# Patient Record
Sex: FEMALE | Race: WHITE | NOT HISPANIC OR LATINO | Employment: PART TIME | URBAN - METROPOLITAN AREA
[De-identification: names, ages, dates, MRNs, and addresses within clinical notes are randomized per-mention and may not be internally consistent; named-entity substitution may affect disease eponyms.]

---

## 2018-03-26 ENCOUNTER — OFFICE VISIT - HEALTHEAST (OUTPATIENT)
Dept: FAMILY MEDICINE | Facility: CLINIC | Age: 23
End: 2018-03-26

## 2018-03-26 DIAGNOSIS — J32.9 SINUSITIS: ICD-10-CM

## 2018-03-26 ASSESSMENT — MIFFLIN-ST. JEOR: SCORE: 1495.67

## 2019-01-01 ENCOUNTER — TRANSFERRED RECORDS (OUTPATIENT)
Dept: MULTI SPECIALTY CLINIC | Facility: CLINIC | Age: 24
End: 2019-01-01

## 2019-01-01 LAB — PAP SMEAR - HIM PATIENT REPORTED: NORMAL

## 2019-04-02 ENCOUNTER — OFFICE VISIT - HEALTHEAST (OUTPATIENT)
Dept: FAMILY MEDICINE | Facility: CLINIC | Age: 24
End: 2019-04-02

## 2019-04-02 DIAGNOSIS — L72.3 SEBACEOUS CYST: ICD-10-CM

## 2019-07-21 ENCOUNTER — COMMUNICATION - HEALTHEAST (OUTPATIENT)
Dept: FAMILY MEDICINE | Facility: CLINIC | Age: 24
End: 2019-07-21

## 2019-07-21 DIAGNOSIS — L98.9 SKIN LESION: ICD-10-CM

## 2019-07-21 DIAGNOSIS — E84.9 CF (CYSTIC FIBROSIS) (H): ICD-10-CM

## 2019-07-22 ENCOUNTER — COMMUNICATION - HEALTHEAST (OUTPATIENT)
Dept: ADMINISTRATIVE | Facility: CLINIC | Age: 24
End: 2019-07-22

## 2019-07-22 DIAGNOSIS — J38.3 OTHER DISEASES OF VOCAL CORDS: ICD-10-CM

## 2019-07-22 DIAGNOSIS — N89.8 CYST, VAGINA: ICD-10-CM

## 2019-07-22 DIAGNOSIS — E84.9 CF (CYSTIC FIBROSIS) (H): ICD-10-CM

## 2019-07-26 ENCOUNTER — RECORDS - HEALTHEAST (OUTPATIENT)
Dept: ADMINISTRATIVE | Facility: OTHER | Age: 24
End: 2019-07-26

## 2019-08-15 ENCOUNTER — RECORDS - HEALTHEAST (OUTPATIENT)
Dept: ADMINISTRATIVE | Facility: OTHER | Age: 24
End: 2019-08-15

## 2019-09-12 ENCOUNTER — COMMUNICATION - HEALTHEAST (OUTPATIENT)
Dept: FAMILY MEDICINE | Facility: CLINIC | Age: 24
End: 2019-09-12

## 2019-09-12 DIAGNOSIS — L72.3 SEBACEOUS CYST: ICD-10-CM

## 2019-10-22 ENCOUNTER — RECORDS - HEALTHEAST (OUTPATIENT)
Dept: ADMINISTRATIVE | Facility: OTHER | Age: 24
End: 2019-10-22

## 2020-01-20 ENCOUNTER — COMMUNICATION - HEALTHEAST (OUTPATIENT)
Dept: FAMILY MEDICINE | Facility: CLINIC | Age: 25
End: 2020-01-20

## 2020-02-05 ENCOUNTER — RECORDS - HEALTHEAST (OUTPATIENT)
Dept: ADMINISTRATIVE | Facility: OTHER | Age: 25
End: 2020-02-05

## 2020-03-18 ENCOUNTER — RECORDS - HEALTHEAST (OUTPATIENT)
Dept: ADMINISTRATIVE | Facility: OTHER | Age: 25
End: 2020-03-18

## 2020-04-02 ENCOUNTER — RECORDS - HEALTHEAST (OUTPATIENT)
Dept: ADMINISTRATIVE | Facility: OTHER | Age: 25
End: 2020-04-02

## 2021-02-18 ENCOUNTER — COMMUNICATION - HEALTHEAST (OUTPATIENT)
Dept: FAMILY MEDICINE | Facility: CLINIC | Age: 26
End: 2021-02-18

## 2021-05-27 NOTE — PROGRESS NOTES
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  It slightly uncomfortable but the thing it is annoying as it drains of serosanguineous fluid.  She is getting ready to go on a cruise, will be wearing bathing attire, and would like to avoid obvious drainage.    She has no vaginal drainage, she is never had a Pap smear and is not sexually active.    ROS: No fever.  No erythema or warmth in the skin around the area.    SH:    reports that  has never smoked. she has never used smokeless tobacco. She reports that she does not drink alcohol or use drugs.      FH: The Patient's family history is not on file.     Meds:  Anu has a current medication list which includes the following prescription(s): albuterol and cephalexin.    O:  BP 98/64   Pulse 100   Resp 16   Wt 151 lb (68.5 kg)   SpO2 99%   BMI 22.30 kg/m    With my nurse Ivette in the room the patient's groin and vaginal area were explored.  There is a 1 x 1-1/2 cm firm area of tenderness in the left inguinal region with a 6 mm opening consistent with a draining cyst.    A/P:   1. Sebaceous cyst  Most likely represents sebaceous cyst.  We discussed options and patient wished to have it explored and possibly drain.  After verbal consent obtained 1 cc of lidocaine with epinephrine was injected in the area after it was prepped with Betadine.  A 22-gauge needle was then used to explore the area and attempt to aspirate fluid when none was obtained.  Will treat with antibiotics and refer to GYN or Derm for removal if needed.  - cephalexin (KEFLEX) 500 MG capsule; Take 1 capsule (500 mg total) by mouth 4 (four) times a day for 10 days.  Dispense: 40 capsule; Refill: 0

## 2021-05-30 NOTE — TELEPHONE ENCOUNTER
Dr. Robles reviewed your notes from patients last visit and wants her to be referred to an ob-gyn instead. She was scheduled with him, but he wants to send her, so please enter a new order. The surgeons office will call and cancel and tell her to expect a vall from an ob gyn. Thank you.

## 2021-05-30 NOTE — TELEPHONE ENCOUNTER
Spoke with the patient and per Dr. Robles this is an OBGYN issue not Gen Surg. Notified Dr. Oliveira's office and they will be submitting an order to OB/GYN

## 2021-06-01 VITALS — WEIGHT: 151.3 LBS | HEIGHT: 69 IN | BODY MASS INDEX: 22.41 KG/M2

## 2021-06-01 NOTE — TELEPHONE ENCOUNTER
Tell pt that I think derm would be fine, and I have placed a referral.  Tell her to keep me posted of the progress.

## 2021-06-02 VITALS — BODY MASS INDEX: 22.3 KG/M2 | WEIGHT: 151 LBS

## 2021-06-05 NOTE — TELEPHONE ENCOUNTER
If pt still has the [facility] and is assigned to our cliic, we cannt give a referral to [facility] clinic to see Dr. Shell, needs to stay within [facility]

## 2021-06-05 NOTE — TELEPHONE ENCOUNTER
Referral Request  Type of referral: ENT  Who s requesting: Mom  Why the request: Tonsillitis without strep   Have you been seen for this request: Yes in 2015  Does patient have a preference on a group/provider? Dr. Shell, [facility], phone number [phone]  Okay to leave a detailed message?  Yes  [phone]    FYI: Caller stated that Dr. Mcgovern had placed a referral to ENT in 2015. Caller stated that the patient went to see the ENT and was advised to have her tonsils removed. Caller stated that at that time the patient was back and forth with school and did not want to proceed with this procedure. Caller stated that the patient is back home from school and went to see another ENT, Dr. Shell and they are requesting a referral be sent as [facility] where Dr. Shell is doing surgery is not in network. Caller stated that she is aware that the referral placed was in 2015 and questioning if Dr. Mcgovern is ok with placing this referral? Caller stated that if the patient is needed to come back to be re-evaluated please call her at [phone], ok to leave a detailed message.

## 2021-06-05 NOTE — TELEPHONE ENCOUNTER
I have only seen this patient twice, she is not mine.  I saw her in 2015 and 2013.  Dr. Oliveira has seen her within the last year.  He will need to give the referral.

## 2021-06-16 NOTE — PROGRESS NOTES
PROGRESS NOTE   3/26/2018    SUBJECTIVE:  Anu Roberto is a 22 y.o. female  who presents for   Chief Complaint   Patient presents with     Sinusitis     Check sinus congestion, headache, decreased hearing in left ear, slight sore throat x 2 weeks     Patient comes in today because she has been feeling well for the last 2 weeks.  She is felt like she has had nasal and sinus congestion for the last 2 weeks.  It may be getting a tiny bit better but not significantly improved.  She is going on a cruise on Saturday and so is going be flying on a plane and so wanted this cleared up before then.  She notes that she is also had decreased hearing in her left ear.  She has previously had problems with earwax is wondering if perhaps it is earwax or if there is something going on that is related to the congestion.  She also has had a sore throat on and off as well as intermittent headaches.  She thinks that she had a fever when this first started but she has not had a fever recently.  She denies that she is having any kind of a cough or other symptoms.    Patient Active Problem List   Diagnosis     Vocal Cord Disorder       Current Outpatient Prescriptions   Medication Sig Dispense Refill     albuterol (VENTOLIN HFA) 90 mcg/actuation inhaler Inhale 2 puffs every 6 (six) hours as needed for wheezing. 1 Inhaler 2     azithromycin (ZITHROMAX Z-MARCIA) 250 MG tablet Take 2 tablets (500 mg) on  Day 1,  followed by 1 tablet (250 mg) once daily on Days 2 through 5. 6 tablet 0     No current facility-administered medications for this visit.        No Known Allergies    Past Medical History:   Diagnosis Date     Other diseases of vocal cords     uses ventolin for this       Past Surgical History:   Procedure Laterality Date     RHINOPLASTY      for broken nose     WISDOM TOOTH EXTRACTION         History   Smoking Status     Never Smoker   Smokeless Tobacco     Never Used       OBJECTIVE:     /64 (Patient Site: Right Arm, Patient  "Position: Sitting, Cuff Size: Adult Regular)  Pulse 68 Comment: regular  Temp 98.1  F (36.7  C) (Oral)   Resp 14 Comment: regular  Ht 5' 9\" (1.753 m)  Wt 151 lb 4.8 oz (68.6 kg)  BMI 22.34 kg/m2    Physical Exam:  GENERAL APPEARANCE: A&A, NAD, well hydrated, well nourished  SKIN:  Normal skin turgor, no lesions/rashes   HEENT: moist mucous membranes, no rhinorrhea, PERRLA, TM's with fluid present bilaterally but no evidence for acute infection, Throat without significant erythema or exudate.  Mild tenderness is noted over the maxillary as well as frontal sinuses bilaterally.  NECK: Supple, full ROM, no significant lymphadenopathy or thyromegaly  CV: RRR, no M/G/R   LUNGS: CTAB  ABDOMEN: S&NT, no masses or organomegaly, positive bowel sounds   EXTREMITY: no swelling noted.  Full range of motion of all 4 extremities.   NEURO: no gross deficits   PSYCHIATRIC;  Mood appropriate, memory intact      ASSESSMENT/PLAN:     Sinusitis [J32.9]      1. Sinusitis  - azithromycin (ZITHROMAX Z-MARCIA) 250 MG tablet; Take 2 tablets (500 mg) on  Day 1,  followed by 1 tablet (250 mg) once daily on Days 2 through 5.  Dispense: 6 tablet; Refill: 0    Patient overall seems to be doing okay.  I am going to go ahead and start her on some Zithromax as I think she probably does have sinus infection.  She has lots of nasal discharge draining down the back of her throat and with the congestion that she has had for over 2 weeks I think she does need an antibiotic.  Prescription for Zithromax was sent to the pharmacy today.  I also suggested that she use some Sudafed to see if that will clear up the fluid that is in her ears.  We talked about the pathophysiology of serous otitis media and the fact that we need to decrease the congestion in order to open the little tubes that then will drain the fluid out of her ears.  I would suspect that this will be a gradual process but should get better with time.  If she has increasing problems or " concerns or does not have gradual improvement in her symptoms she certainly should let us know.  We otherwise will see her on an as-needed basis  Marely Castillo MD

## 2021-08-01 ENCOUNTER — TRANSFERRED RECORDS (OUTPATIENT)
Dept: MULTI SPECIALTY CLINIC | Facility: CLINIC | Age: 26
End: 2021-08-01

## 2021-08-01 LAB — PAP SMEAR - HIM PATIENT REPORTED: NEGATIVE

## 2021-08-21 ENCOUNTER — HEALTH MAINTENANCE LETTER (OUTPATIENT)
Age: 26
End: 2021-08-21

## 2021-10-06 ENCOUNTER — TRANSFERRED RECORDS (OUTPATIENT)
Dept: HEALTH INFORMATION MANAGEMENT | Facility: CLINIC | Age: 26
End: 2021-10-06

## 2021-10-16 ENCOUNTER — HEALTH MAINTENANCE LETTER (OUTPATIENT)
Age: 26
End: 2021-10-16

## 2022-05-25 ASSESSMENT — ENCOUNTER SYMPTOMS
BREAST MASS: 0
DYSURIA: 0
NAUSEA: 0
COUGH: 0
ABDOMINAL PAIN: 0
CONSTIPATION: 0
FREQUENCY: 0
HEARTBURN: 0
DIZZINESS: 0
MYALGIAS: 0
HEMATURIA: 0
HEMATOCHEZIA: 0
DIARRHEA: 0
FEVER: 0
HEADACHES: 0
WEAKNESS: 0
ARTHRALGIAS: 0
PALPITATIONS: 0
SORE THROAT: 0
JOINT SWELLING: 0
PARESTHESIAS: 0
CHILLS: 0
EYE PAIN: 0
NERVOUS/ANXIOUS: 0
SHORTNESS OF BREATH: 0

## 2022-05-26 ENCOUNTER — OFFICE VISIT (OUTPATIENT)
Dept: FAMILY MEDICINE | Facility: CLINIC | Age: 27
End: 2022-05-26
Payer: COMMERCIAL

## 2022-05-26 VITALS
OXYGEN SATURATION: 96 % | HEIGHT: 70 IN | SYSTOLIC BLOOD PRESSURE: 120 MMHG | BODY MASS INDEX: 22.38 KG/M2 | HEART RATE: 81 BPM | DIASTOLIC BLOOD PRESSURE: 80 MMHG | WEIGHT: 156.3 LBS

## 2022-05-26 DIAGNOSIS — Z11.1 SCREENING EXAMINATION FOR PULMONARY TUBERCULOSIS: ICD-10-CM

## 2022-05-26 DIAGNOSIS — Z00.00 ANNUAL PHYSICAL EXAM: Primary | ICD-10-CM

## 2022-05-26 PROCEDURE — 86481 TB AG RESPONSE T-CELL SUSP: CPT | Performed by: NURSE PRACTITIONER

## 2022-05-26 PROCEDURE — 36415 COLL VENOUS BLD VENIPUNCTURE: CPT | Performed by: NURSE PRACTITIONER

## 2022-05-26 PROCEDURE — 99385 PREV VISIT NEW AGE 18-39: CPT | Performed by: NURSE PRACTITIONER

## 2022-05-26 ASSESSMENT — ENCOUNTER SYMPTOMS
CHILLS: 0
NERVOUS/ANXIOUS: 0
EYE PAIN: 0
COUGH: 0
BREAST MASS: 0
FEVER: 0
CONSTIPATION: 0
MYALGIAS: 0
ARTHRALGIAS: 0
PALPITATIONS: 0
PARESTHESIAS: 0
WEAKNESS: 0
HEADACHES: 0
FREQUENCY: 0
NAUSEA: 0
DIARRHEA: 0
DYSURIA: 0
HEMATURIA: 0
HEARTBURN: 0
SHORTNESS OF BREATH: 0
SORE THROAT: 0
JOINT SWELLING: 0
ABDOMINAL PAIN: 0
DIZZINESS: 0
HEMATOCHEZIA: 0

## 2022-05-26 ASSESSMENT — PAIN SCALES - GENERAL: PAINLEVEL: NO PAIN (0)

## 2022-05-26 NOTE — PROGRESS NOTES
SUBJECTIVE:   CC: Anu Roberto is an 26 year old woman who presents for preventive health visit.     Doing well.  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    Patient has been advised of split billing requirements and indicates understanding: Yes  Healthy Habits:     Getting at least 3 servings of Calcium per day:  Yes    Bi-annual eye exam:  Yes    Dental care twice a year:  Yes    Sleep apnea or symptoms of sleep apnea:  None    Diet:  Regular (no restrictions)    Frequency of exercise:  2-3 days/week    Duration of exercise:  30-45 minutes    Taking medications regularly:  Not Applicable    Medication side effects:  Not applicable    PHQ-2 Total Score: 0    Additional concerns today:  No      Today's PHQ-2 Score:   PHQ-2 ( 1999 [employer]) 5/25/2022   Q1: Little interest or pleasure in doing things 0   Q2: Feeling down, depressed or hopeless 0   PHQ-2 Score 0   Q1: Little interest or pleasure in doing things Not at all   Q2: Feeling down, depressed or hopeless Not at all   PHQ-2 Score 0       Abuse: Current or Past (Physical, Sexual or Emotional) - No  Do you feel safe in your environment? Yes    Have you ever done Advance Care Planning? (For example, a Health Directive, POLST, or a discussion with a medical provider or your loved ones about your wishes): No, advance care planning information given to patient to review.  Patient declined advance care planning discussion at this time.    Social History     Tobacco Use     Smoking status: Never Smoker     Smokeless tobacco: Never Used   Substance Use Topics     Alcohol use: No     If you drink alcohol do you typically have >3 drinks per day or >7 drinks per week? No    Alcohol Use 5/25/2022   Prescreen: >3 drinks/day or >7 drinks/week? No       Reviewed orders with patient.  Reviewed health maintenance and updated orders accordingly - Yes  Lab work is in process    Breast Cancer Screening:    Breast CA Risk Assessment  (FHS-7) 5/25/2022   Do you have a family history of breast, colon, or ovarian cancer? No / Unknown       Patient under 40 years of age: Routine Mammogram Screening not recommended.   Pertinent mammograms are reviewed under the imaging tab.     Reviewed and updated as needed this visit by clinical staff   Tobacco  Allergies  Meds                Reviewed and updated as needed this visit by Provider                   No past medical history on file.   Past Surgical History:   Procedure Laterality Date     RHINOPLASTY      for broken nose     WISDOM TOOTH EXTRACTION         Review of Systems   Constitutional: Negative for chills and fever.   HENT: Negative for congestion, ear pain, hearing loss and sore throat.    Eyes: Negative for pain and visual disturbance.   Respiratory: Negative for cough and shortness of breath.    Cardiovascular: Negative for chest pain, palpitations and peripheral edema.   Gastrointestinal: Negative for abdominal pain, constipation, diarrhea, heartburn, hematochezia and nausea.   Breasts:  Negative for tenderness, breast mass and discharge.   Genitourinary: Negative for dysuria, frequency, genital sores, hematuria, pelvic pain, urgency, vaginal bleeding and vaginal discharge.   Musculoskeletal: Negative for arthralgias, joint swelling and myalgias.   Skin: Negative for rash.   Neurological: Negative for dizziness, weakness, headaches and paresthesias.   Psychiatric/Behavioral: Negative for mood changes. The patient is not nervous/anxious.      CONSTITUTIONAL: NEGATIVE for fever, chills, change in weight  INTEGUMENTARU/SKIN: NEGATIVE for worrisome rashes, moles or lesions  EYES: NEGATIVE for vision changes or irritation  ENT: NEGATIVE for ear, mouth and throat problems  RESP: NEGATIVE for significant cough or SOB  BREAST: NEGATIVE for masses, tenderness or discharge  CV: NEGATIVE for chest pain, palpitations or peripheral edema  GI: NEGATIVE for nausea, abdominal pain, heartburn, or change in  "bowel habits  : NEGATIVE for unusual urinary or vaginal symptoms. Periods are regular.  MUSCULOSKELETAL: NEGATIVE for significant arthralgias or myalgia  NEURO: NEGATIVE for weakness, dizziness or paresthesias  PSYCHIATRIC: NEGATIVE for changes in mood or affect     OBJECTIVE:   /80   Pulse 81   Ht 1.778 m (5' 10\")   Wt 70.9 kg (156 lb 4.8 oz)   LMP 04/24/2022 (Exact Date)   SpO2 96%   Breastfeeding No   BMI 22.43 kg/m    Physical Exam  GENERAL: healthy, alert and no distress  EYES: Eyes grossly normal to inspection, PERRL and conjunctivae and sclerae normal  HENT: ear canals and TM's normal, nose and mouth without ulcers or lesions  NECK: no adenopathy, no asymmetry, masses, or scars and thyroid normal to palpation  RESP: lungs clear to auscultation - no rales, rhonchi or wheezes  CV: regular rate and rhythm, normal S1 S2, no S3 or S4, no murmur, click or rub, no peripheral edema and peripheral pulses strong  ABDOMEN: soft, nontender, no hepatosplenomegaly, no masses and bowel sounds normal  MS: no gross musculoskeletal defects noted, no edema  SKIN: no suspicious lesions or rashes  NEURO: Normal strength and tone, mentation intact and speech normal  PSYCH: mentation appears normal, affect normal/bright    Diagnostic Test Results:  Labs reviewed in Epic    ASSESSMENT/PLAN:       ICD-10-CM    1. Annual physical exam  Z00.00    2. Screening examination for pulmonary tuberculosis  Z11.1 Quantiferon TB Gold Plus     Quantiferon TB Gold Plus     Doing well.  She declines Pap smear today.  TB screening ordered.  Reviewed healthy lifestyle.    Patient has been advised of split billing requirements and indicates understanding: No    COUNSELING:  Reviewed preventive health counseling, as reflected in patient instructions    Estimated body mass index is 22.43 kg/m  as calculated from the following:    Height as of this encounter: 1.778 m (5' 10\").    Weight as of this encounter: 70.9 kg (156 lb 4.8 " oz).        She reports that she has never smoked. She has never used smokeless tobacco.      Counseling Resources:  ATP IV Guidelines  Pooled Cohorts Equation Calculator  Breast Cancer Risk Calculator  BRCA-Related Cancer Risk Assessment: FHS-7 Tool  FRAX Risk Assessment  ICSI Preventive Guidelines  Dietary Guidelines for Americans, 2010  USDA's MyPlate  ASA Prophylaxis  Lung CA Screening    Yair Mejias NP  [facility]

## 2022-05-26 NOTE — PATIENT INSTRUCTIONS
Paperwork completed and given back to you. Copy was made just in case and will be scanned into your chart.    TB gold testing should come back tomorrow or over the weekend and be viewable via [employer].        Preventive Health Recommendations  Female Ages 26 - 39  Yearly exam:   See your health care provider every year in order to    Review health changes.     Discuss preventive care.      Review your medicines if you your doctor has prescribed any.    Until age 30: Get a Pap test every three years (more often if you have had an abnormal result).    After age 30: Talk to your doctor about whether you should have a Pap test every 3 years or have a Pap test with HPV screening every 5 years.   You do not need a Pap test if your uterus was removed (hysterectomy) and you have not had cancer.  You should be tested each year for STDs (sexually transmitted diseases), if you're at risk.   Talk to your provider about how often to have your cholesterol checked.  If you are at risk for diabetes, you should have a diabetes test (fasting glucose).  Shots: Get a flu shot each year. Get a tetanus shot every 10 years.   Nutrition:     Eat at least 5 servings of fruits and vegetables each day.    Eat whole-grain bread, whole-wheat pasta and brown rice instead of white grains and rice.    Get adequate Calcium and Vitamin D.     Lifestyle    Exercise at least 150 minutes a week (30 minutes a day, 5 days of the week). This will help you control your weight and prevent disease.    Limit alcohol to one drink per day.    No smoking.     Wear sunscreen to prevent skin cancer.    See your dentist every six months for an exam and cleaning.

## 2022-05-28 LAB
GAMMA INTERFERON BACKGROUND BLD IA-ACNC: 0 IU/ML
M TB IFN-G BLD-IMP: NEGATIVE
M TB IFN-G CD4+ BCKGRND COR BLD-ACNC: 10 IU/ML
MITOGEN IGNF BCKGRD COR BLD-ACNC: 0.06 IU/ML
MITOGEN IGNF BCKGRD COR BLD-ACNC: 0.13 IU/ML
QUANTIFERON MITOGEN: 10 IU/ML
QUANTIFERON NIL TUBE: 0 IU/ML
QUANTIFERON TB1 TUBE: 0.06 IU/ML
QUANTIFERON TB2 TUBE: 0.13

## 2022-05-31 ENCOUNTER — TELEPHONE (OUTPATIENT)
Dept: FAMILY MEDICINE | Facility: CLINIC | Age: 27
End: 2022-05-31
Payer: COMMERCIAL

## 2022-05-31 NOTE — TELEPHONE ENCOUNTER
----- Message from Yair Mejias NP sent at 5/30/2022  9:11 AM CDT -----  Please reach out to patient to let her know that her tuberculosis testing is negative.  Ask if she would like to just print these results off her MyChart or if she would like us to mail them or would she like us to leave a copy up front.    Yair Mjeias, CNP

## 2022-05-31 NOTE — TELEPHONE ENCOUNTER
Left message to call back for: pt  Information to relay to patient: please see below message from Yair and forward back with what the pt preference is.

## 2022-10-01 ENCOUNTER — HEALTH MAINTENANCE LETTER (OUTPATIENT)
Age: 27
End: 2022-10-01

## 2023-05-06 ASSESSMENT — ENCOUNTER SYMPTOMS
BREAST MASS: 0
ABDOMINAL PAIN: 0
NAUSEA: 0
CHILLS: 0
CONSTIPATION: 0
SORE THROAT: 0
DIZZINESS: 0
SHORTNESS OF BREATH: 0
EYE PAIN: 0
WEAKNESS: 0
MYALGIAS: 0
HEMATURIA: 0
HEARTBURN: 0
DYSURIA: 0
ARTHRALGIAS: 0
HEMATOCHEZIA: 0
NERVOUS/ANXIOUS: 0
JOINT SWELLING: 0
DIARRHEA: 0
PARESTHESIAS: 0
COUGH: 0
HEADACHES: 0
FEVER: 0
PALPITATIONS: 0
FREQUENCY: 0

## 2023-05-12 ENCOUNTER — OFFICE VISIT (OUTPATIENT)
Dept: FAMILY MEDICINE | Facility: CLINIC | Age: 28
End: 2023-05-12
Payer: COMMERCIAL

## 2023-05-12 VITALS
TEMPERATURE: 98 F | WEIGHT: 154.9 LBS | SYSTOLIC BLOOD PRESSURE: 124 MMHG | OXYGEN SATURATION: 98 % | RESPIRATION RATE: 16 BRPM | HEART RATE: 59 BPM | BODY MASS INDEX: 22.94 KG/M2 | DIASTOLIC BLOOD PRESSURE: 80 MMHG | HEIGHT: 69 IN

## 2023-05-12 DIAGNOSIS — Z11.1 SCREENING EXAMINATION FOR PULMONARY TUBERCULOSIS: ICD-10-CM

## 2023-05-12 DIAGNOSIS — Z00.00 ANNUAL PHYSICAL EXAM: Primary | ICD-10-CM

## 2023-05-12 PROCEDURE — 99395 PREV VISIT EST AGE 18-39: CPT | Performed by: NURSE PRACTITIONER

## 2023-05-12 PROCEDURE — 36415 COLL VENOUS BLD VENIPUNCTURE: CPT | Performed by: NURSE PRACTITIONER

## 2023-05-12 PROCEDURE — 86481 TB AG RESPONSE T-CELL SUSP: CPT | Performed by: NURSE PRACTITIONER

## 2023-05-12 ASSESSMENT — ENCOUNTER SYMPTOMS
NAUSEA: 0
EYE PAIN: 0
WEAKNESS: 0
JOINT SWELLING: 0
HEMATOCHEZIA: 0
ARTHRALGIAS: 0
PALPITATIONS: 0
CONSTIPATION: 0
DIARRHEA: 0
MYALGIAS: 0
ABDOMINAL PAIN: 0
DYSURIA: 0
SORE THROAT: 0
BREAST MASS: 0
HEARTBURN: 0
FREQUENCY: 0
FEVER: 0
SHORTNESS OF BREATH: 0
HEADACHES: 0
CHILLS: 0
COUGH: 0
PARESTHESIAS: 0
NERVOUS/ANXIOUS: 0
HEMATURIA: 0
DIZZINESS: 0

## 2023-05-12 ASSESSMENT — PAIN SCALES - GENERAL: PAINLEVEL: NO PAIN (0)

## 2023-05-12 NOTE — PROGRESS NOTES
SUBJECTIVE:   CC: Anu is an 27 year old who presents for preventive health visit.       2023     8:26 AM   Additional Questions   Roomed by Mariana Grace CMA   Patient has been advised of split billing requirements and indicates understanding: Yes     Doing well.  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  Needs updated QuantiFERON TB Gold and forms to fill out today.    Healthy Habits:     Getting at least 3 servings of Calcium per day:  Yes    Bi-annual eye exam:  Yes    Dental care twice a year:  Yes    Sleep apnea or symptoms of sleep apnea:  None    Diet:  Regular (no restrictions)    Frequency of exercise:  4-5 days/week    Duration of exercise:  45-60 minutes    Taking medications regularly:  Yes    Medication side effects:  Not applicable    PHQ-2 Total Score: 0    Additional concerns today:  No      Today's PHQ-2 Score:       2023     1:46 AM   PHQ-2 (  [employer])   Q1: Little interest or pleasure in doing things 0   Q2: Feeling down, depressed or hopeless 0   PHQ-2 Score 0   Q1: Little interest or pleasure in doing things Not at all    Not at all   Q2: Feeling down, depressed or hopeless Not at all    Not at all   PHQ-2 Score 0    0       Social History     Tobacco Use     Smoking status: Never     Smokeless tobacco: Never   Vaping Use     Vaping status: Not on file   Substance Use Topics     Alcohol use: No         2023     1:46 AM   Alcohol Use   Prescreen: >3 drinks/day or >7 drinks/week? No     Reviewed orders with patient.  Reviewed health maintenance and updated orders accordingly - Yes  Lab work is in process    Breast Cancer Screenin/25/2022     4:36 AM   Breast CA Risk Assessment (FHS-7)   Do you have a family history of breast, colon, or ovarian cancer? No / Unknown       click delete button to remove this line now  Patient under 40 years of age: Routine Mammogram Screening not recommended.   Pertinent mammograms are reviewed under the imaging  "tab.    History of abnormal Pap smear: NO - age 21-29 PAP every 3 years recommended     Reviewed and updated as needed this visit by clinical staff   Tobacco  Allergies  Meds              Reviewed and updated as needed this visit by Provider                 No past medical history on file.   Past Surgical History:   Procedure Laterality Date     RHINOPLASTY      for broken nose     WISDOM TOOTH EXTRACTION         Review of Systems   Constitutional: Negative for chills and fever.   HENT: Negative for congestion, ear pain, hearing loss and sore throat.    Eyes: Negative for pain and visual disturbance.   Respiratory: Negative for cough and shortness of breath.    Cardiovascular: Negative for chest pain, palpitations and peripheral edema.   Gastrointestinal: Negative for abdominal pain, constipation, diarrhea, heartburn, hematochezia and nausea.   Breasts:  Negative for tenderness, breast mass and discharge.   Genitourinary: Negative for dysuria, frequency, genital sores, hematuria, pelvic pain, urgency, vaginal bleeding and vaginal discharge.   Musculoskeletal: Negative for arthralgias, joint swelling and myalgias.   Skin: Negative for rash.   Neurological: Negative for dizziness, weakness, headaches and paresthesias.   Psychiatric/Behavioral: Negative for mood changes. The patient is not nervous/anxious.         OBJECTIVE:   /80 (BP Location: Right arm, Patient Position: Sitting, Cuff Size: Adult Regular)   Pulse 59   Temp 98  F (36.7  C) (Oral)   Resp 16   Ht 1.753 m (5' 9\")   Wt 70.3 kg (154 lb 14.4 oz)   LMP 04/28/2023 (Approximate)   SpO2 98%   BMI 22.87 kg/m    Physical Exam  GENERAL: healthy, alert and no distress  EYES: Eyes grossly normal to inspection, PERRL and conjunctivae and sclerae normal  HENT: ear canals and TM's normal, nose and mouth without ulcers or lesions  NECK: no adenopathy, no asymmetry, masses, or scars and thyroid normal to palpation  RESP: lungs clear to auscultation - no " rales, rhonchi or wheezes  CV: regular rate and rhythm, normal S1 S2, no S3 or S4, no murmur, click or rub, no peripheral edema and peripheral pulses strong  ABDOMEN: soft, nontender, no hepatosplenomegaly, no masses and bowel sounds normal  MS: no gross musculoskeletal defects noted, no edema  SKIN: no suspicious lesions or rashes  NEURO: Normal strength and tone, mentation intact and speech normal  PSYCH: mentation appears normal, affect normal/bright    Diagnostic Test Results:  Labs reviewed in Epic    ASSESSMENT/PLAN:       ICD-10-CM    1. Annual physical exam  Z00.00       2. Screening examination for pulmonary tuberculosis  Z11.1 Quantiferon TB Gold Plus     Quantiferon TB Gold Plus        Doing well.  Congratulated on healthy lifestyle.  Update TB Gold.  Paperwork filled out.  She plans to get Pap smear done at OB/GYN.    Patient has been advised of split billing requirements and indicates understanding: No      COUNSELING:  Reviewed preventive health counseling, as reflected in patient instructions        She reports that she has never smoked. She has never used smokeless tobacco.    Yair Mejias NP  [facility]

## 2023-05-15 ENCOUNTER — TELEPHONE (OUTPATIENT)
Dept: FAMILY MEDICINE | Facility: CLINIC | Age: 28
End: 2023-05-15
Payer: COMMERCIAL

## 2023-05-15 DIAGNOSIS — R76.12 POSITIVE QUANTIFERON-TB GOLD TEST: Primary | ICD-10-CM

## 2023-05-15 LAB
GAMMA INTERFERON BACKGROUND BLD IA-ACNC: 0.02 IU/ML
M TB IFN-G BLD-IMP: POSITIVE
M TB IFN-G CD4+ BCKGRND COR BLD-ACNC: 9.98 IU/ML
MITOGEN IGNF BCKGRD COR BLD-ACNC: 0.57 IU/ML
MITOGEN IGNF BCKGRD COR BLD-ACNC: 0.58 IU/ML
QUANTIFERON MITOGEN: 10 IU/ML
QUANTIFERON NIL TUBE: 0.02 IU/ML
QUANTIFERON TB1 TUBE: 0.6 IU/ML
QUANTIFERON TB2 TUBE: 0.59

## 2023-05-15 NOTE — TELEPHONE ENCOUNTER
Left message to call back for: patient  Information to relay to patient: see message below from provider. Makayla Brooks, CMA on 5/15/2023 at 4:32 PM

## 2023-05-15 NOTE — TELEPHONE ENCOUNTER
----- Message from Yair Mejias NP sent at 5/15/2023 12:36 PM CDT -----  Please call.    TB screening came back positive.  Typically what I do in the situations is have them come in for chest x-ray to confirm no active pulmonary tuberculosis and then connect them with infectious disease for confirmation work-up. Xray ordered and ID referral placed.    Yair Mejias, CNP

## 2023-05-15 NOTE — LETTER
May 17, 2023      Anu Roberto  [id number] [address] [facility] [address]        Dear ,    We have been unsuccessful reaching you by phone. Please see provider comments below.    TB screening came back positive.  Typically what I do in the situations is have them come in for chest x-ray to confirm no active pulmonary tuberculosis and then connect them with infectious disease for confirmation work-up. Xray ordered and ID referral placed.        Resulted Orders   Quantiferon TB Gold Plus Grey Tube   Result Value Ref Range    Quantiferon Nil Tube 0.02 IU/mL   Quantiferon TB Gold Plus Green Tube   Result Value Ref Range    Quantiferon TB1 Tube 0.60 IU/mL   Quantiferon TB Gold Plus Yellow Tube   Result Value Ref Range    Quantiferon TB2 Tube 0.59    Quantiferon TB Gold Plus Purple Tube   Result Value Ref Range    Quantiferon Mitogen 10.00 IU/mL   Quantiferon TB Gold Plus   Result Value Ref Range    Quantiferon-TB Gold Plus Positive (A) Negative      Comment:      Interferon gamma response to M.tuberculosis antigens was detected,suggesting infection with M.tuberculosis. Positive results in patients at low risk for infection should be interpreted with caution and repeat testing on a new sample should be considered as recommended by the 2017 ATS/IDSA/CDC Clinical Prac  maninder Guidelines for Diagnosis of Tuberculosis in Adults and Children     TB1 Ag minus Nil Value 0.58 IU/mL    TB2 Ag minus Nil Value 0.57 IU/mL    Mitogen minus Nil Result 9.98 IU/mL    Nil Result 0.02 IU/mL       If you have any questions or concerns, please call the clinic at the number listed above.       Sincerely,

## 2023-05-16 ENCOUNTER — TELEPHONE (OUTPATIENT)
Dept: MULTI SPECIALTY CLINIC | Facility: CLINIC | Age: 28
End: 2023-05-16
Payer: COMMERCIAL

## 2023-05-16 NOTE — TELEPHONE ENCOUNTER
Patient will need chest xray first before she can be schedule with Infectious Disease.  Ordering provider will need to order and call patient to schedule chest xray.

## 2023-05-16 NOTE — TELEPHONE ENCOUNTER
Please review referral and advise on scheduling.      Authorizing: Yair Mejias NP in [facility]    Referral: [id number] (Referral NOT Required)       Expires: 5/15/2024 Priority: Routine: Next available opening   Diagnosis: Positive QuantiFERON-TB Gold test [R76.12]

## 2023-05-22 ENCOUNTER — ANCILLARY PROCEDURE (OUTPATIENT)
Dept: GENERAL RADIOLOGY | Facility: CLINIC | Age: 28
End: 2023-05-22
Attending: NURSE PRACTITIONER
Payer: COMMERCIAL

## 2023-05-22 DIAGNOSIS — R76.12 POSITIVE QUANTIFERON-TB GOLD TEST: ICD-10-CM

## 2023-05-22 PROCEDURE — 71046 X-RAY EXAM CHEST 2 VIEWS: CPT | Mod: TC | Performed by: RADIOLOGY

## 2023-06-27 ASSESSMENT — ANXIETY QUESTIONNAIRES
5. BEING SO RESTLESS THAT IT IS HARD TO SIT STILL: NOT AT ALL
4. TROUBLE RELAXING: NOT AT ALL
8. IF YOU CHECKED OFF ANY PROBLEMS, HOW DIFFICULT HAVE THESE MADE IT FOR YOU TO DO YOUR WORK, TAKE CARE OF THINGS AT HOME, OR GET ALONG WITH OTHER PEOPLE?: VERY DIFFICULT
7. FEELING AFRAID AS IF SOMETHING AWFUL MIGHT HAPPEN: NOT AT ALL
GAD7 TOTAL SCORE: 10
6. BECOMING EASILY ANNOYED OR IRRITABLE: NEARLY EVERY DAY
3. WORRYING TOO MUCH ABOUT DIFFERENT THINGS: NEARLY EVERY DAY
1. FEELING NERVOUS, ANXIOUS, OR ON EDGE: MORE THAN HALF THE DAYS
IF YOU CHECKED OFF ANY PROBLEMS ON THIS QUESTIONNAIRE, HOW DIFFICULT HAVE THESE PROBLEMS MADE IT FOR YOU TO DO YOUR WORK, TAKE CARE OF THINGS AT HOME, OR GET ALONG WITH OTHER PEOPLE: VERY DIFFICULT
GAD7 TOTAL SCORE: 10
2. NOT BEING ABLE TO STOP OR CONTROL WORRYING: MORE THAN HALF THE DAYS
7. FEELING AFRAID AS IF SOMETHING AWFUL MIGHT HAPPEN: NOT AT ALL

## 2023-06-30 ENCOUNTER — VIRTUAL VISIT (OUTPATIENT)
Dept: FAMILY MEDICINE | Facility: CLINIC | Age: 28
End: 2023-06-30
Payer: COMMERCIAL

## 2023-06-30 DIAGNOSIS — F41.9 ANXIETY: Primary | ICD-10-CM

## 2023-06-30 PROCEDURE — 99214 OFFICE O/P EST MOD 30 MIN: CPT | Mod: VID | Performed by: FAMILY MEDICINE

## 2023-06-30 RX ORDER — HYDROXYZINE HYDROCHLORIDE 25 MG/1
25 TABLET, FILM COATED ORAL EVERY 6 HOURS PRN
Qty: 30 TABLET | Refills: 3 | Status: SHIPPED | OUTPATIENT
Start: 2023-06-30 | End: 2024-02-09

## 2023-06-30 ASSESSMENT — ENCOUNTER SYMPTOMS: NERVOUS/ANXIOUS: 1

## 2023-06-30 ASSESSMENT — ANXIETY QUESTIONNAIRES: GAD7 TOTAL SCORE: 10

## 2023-06-30 NOTE — PROGRESS NOTES
Anu is a 27 year old who is being evaluated via a billable video visit.      How would you like to obtain your AVS? MyChart  If the video visit is dropped, the invitation should be resent by: Send to e-mail at: [email or web address]  Will anyone else be joining your video visit? Yes: Patients mother. How would they like to receive their invitation? Other e-mail: same room      Subjective   Anu is a 27 year old, presenting for the following health issues:  Depression and Anxiety        6/30/2023     7:35 AM   Additional Questions   Roomed by Kirby FAITH     Anxiety    History of Present Illness       Mental Health Follow-up:  Patient presents to follow-up on Depression & Anxiety.Patient's depression since last visit has been:  Medium  The patient is not having other symptoms associated with depression.  Patient's anxiety since last visit has been:  Bad  The patient is not having other symptoms associated with anxiety.  Any significant life events: other  Patient is feeling anxious or having panic attacks.  Patient has no concerns about alcohol or drug use.    She eats 2-3 servings of fruits and vegetables daily.She consumes 0 sweetened beverage(s) daily.She exercises with enough effort to increase her heart rate 30 to 60 minutes per day.  She exercises with enough effort to increase her heart rate 4 days per week.   She is taking medications regularly.  Today's FANNY-7 Score: 10       Patient having stressors with school and relationships. Patient has never been treated in the past. No family history.    Review of Systems   Psychiatric/Behavioral: The patient is nervous/anxious.       Constitutional, HEENT, cardiovascular, pulmonary, GI, , musculoskeletal, neuro, skin, endocrine and psych systems are negative, except as otherwise noted.      Objective           Vitals:  No vitals were obtained today due to virtual visit.    Physical Exam   GENERAL: Healthy, alert and no distress  EYES: Eyes grossly normal to  inspection.  No discharge or erythema, or obvious scleral/conjunctival abnormalities.  RESP: No audible wheeze, cough, or visible cyanosis.  No visible retractions or increased work of breathing.    SKIN: Visible skin clear. No significant rash, abnormal pigmentation or lesions.  NEURO: Cranial nerves grossly intact.  Mentation and speech appropriate for age.  PSYCH: Mentation appears normal, affect normal/bright, judgement and insight intact, normal speech and appearance well-groomed.    A/P:  (F41.9) Anxiety  (primary encounter diagnosis)  Comment:   Plan: Adult Mental Health  Referral,         FLUoxetine (PROZAC) 20 MG capsule, hydrOXYzine         (ATARAX) 25 MG tablet        Trial an ssri with fluoxetine 20 mg daily. Discussed potential side effects. May use hydroxyzine as needed. Would like patient to f/u in about 4-6 weeks for recheck.    Ben Zavaleta MD          Video-Visit Details    Type of service:  Video Visit     Originating Location (pt. Location): Home  Distant Location (provider location):  On-site  Platform used for Video Visit: Veronique

## 2023-12-01 ENCOUNTER — MYC MEDICAL ADVICE (OUTPATIENT)
Dept: FAMILY MEDICINE | Facility: CLINIC | Age: 28
End: 2023-12-01
Payer: COMMERCIAL

## 2023-12-01 DIAGNOSIS — J03.90 TONSILLITIS: Primary | ICD-10-CM

## 2024-01-09 ENCOUNTER — TRANSFERRED RECORDS (OUTPATIENT)
Dept: HEALTH INFORMATION MANAGEMENT | Facility: CLINIC | Age: 29
End: 2024-01-09
Payer: COMMERCIAL

## 2024-02-09 ENCOUNTER — OFFICE VISIT (OUTPATIENT)
Dept: FAMILY MEDICINE | Facility: CLINIC | Age: 29
End: 2024-02-09
Payer: COMMERCIAL

## 2024-02-09 VITALS
RESPIRATION RATE: 10 BRPM | HEIGHT: 69 IN | TEMPERATURE: 97.7 F | WEIGHT: 162.5 LBS | OXYGEN SATURATION: 100 % | SYSTOLIC BLOOD PRESSURE: 114 MMHG | HEART RATE: 67 BPM | BODY MASS INDEX: 24.07 KG/M2 | DIASTOLIC BLOOD PRESSURE: 70 MMHG

## 2024-02-09 DIAGNOSIS — Z01.818 PREOP GENERAL PHYSICAL EXAM: Primary | ICD-10-CM

## 2024-02-09 DIAGNOSIS — J35.01 CHRONIC TONSILLITIS: ICD-10-CM

## 2024-02-09 LAB
HCG UR QL: NEGATIVE
HGB BLD-MCNC: 14.3 G/DL (ref 11.7–15.7)

## 2024-02-09 PROCEDURE — 99214 OFFICE O/P EST MOD 30 MIN: CPT | Performed by: NURSE PRACTITIONER

## 2024-02-09 PROCEDURE — 85018 HEMOGLOBIN: CPT | Performed by: NURSE PRACTITIONER

## 2024-02-09 PROCEDURE — 81025 URINE PREGNANCY TEST: CPT | Performed by: NURSE PRACTITIONER

## 2024-02-09 PROCEDURE — 36415 COLL VENOUS BLD VENIPUNCTURE: CPT | Performed by: NURSE PRACTITIONER

## 2024-02-09 ASSESSMENT — PAIN SCALES - GENERAL: PAINLEVEL: NO PAIN (0)

## 2024-02-09 NOTE — PROGRESS NOTES
Preoperative Evaluation  [facility]  [address]  [place]  [facility] [address]  Phone: [phone]  Fax: [phone]  Primary Provider: Yair Mejias  Pre-op Performing Provider: YAIR MEJIAS  Feb 9, 2024       Anu is a 28 year old, presenting for the following:  Pre-Op Exam (2/15/24 for tonsillectomy. Needs UPT and Hemoglobin. )        2/9/2024     8:45 AM   Additional Questions   Roomed by Maylin KELLY CMA     Surgical Information  Surgery/Procedure: Tonsillectomy  Surgery Location: [facility]  Surgeon: Dr. Serge Mcgee  Surgery Date: 2/15/24  Time of Surgery: TBD  Where patient plans to recover: At home with family  Fax number for surgical facility: [phone], [phone]    Assessment & Plan     The proposed surgical procedure is considered INTERMEDIATE risk.    Preop general physical exam  Medically optimized for surgery  - Hemoglobin; Future  - HCG qualitative urine; Future  - Hemoglobin  - HCG qualitative urine    Chronic tonsillitis  Planned tonsillectomy     - No identified additional risk factors other than previously addressed    Hold NSAIDs and supplements 1 week prior to surgery    Recommendation  APPROVAL GIVEN to proceed with proposed procedure, without further diagnostic evaluation.    Reviewed ENT note x 1    Subjective       Via the Health Maintenance questionnaire, the patient has reported the following services have been completed -Cervical Cancer Screening, this information has been sent to the abstraction team.    HPI related to upcoming procedure: Patient has had recurrent tonsillitis symptoms.  Did see ENT and recommendations were for tonsillectomy.        2/9/2024     8:42 AM   Preop Questions   1. Have you ever had a heart attack or stroke? No   2. Have you ever had surgery on your heart or blood vessels, such as a stent placement, a coronary artery bypass, or surgery on an artery in your  head, neck, heart, or legs? No   3. Do you have chest pain with activity? No   4. Do you have a history of  heart failure? No   5. Do you currently have a cold, bronchitis or symptoms of other infection? No   6. Do you have a cough, shortness of breath, or wheezing? No   7. Do you or anyone in your family have previous history of blood clots? YES - Grandma-hx TIA in the setting of afib   8. Do you or does anyone in your family have a serious bleeding problem such as prolonged bleeding following surgeries or cuts? No   9. Have you ever had problems with anemia or been told to take iron pills? No   10. Have you had any abnormal blood loss such as black, tarry or bloody stools, or abnormal vaginal bleeding? No   11. Have you ever had a blood transfusion? No   12. Are you willing to have a blood transfusion if it is medically needed before, during, or after your surgery? Yes   13. Have you or any of your relatives ever had problems with anesthesia? No   14. Do you have sleep apnea, excessive snoring or daytime drowsiness? No   15. Do you have any artifical heart valves or other implanted medical devices like a pacemaker, defibrillator, or continuous glucose monitor? No   16. Do you have artificial joints? No   17. Are you allergic to latex? No   18. Is there any chance that you may be pregnant? No       Health Care Directive  Patient does not have a Health Care Directive or Living Will: Previously reviewed    Preoperative Review of    reviewed - no record of controlled substances prescribed.      Status of Chronic Conditions:  See problem list for active medical problems.  Problems all longstanding and stable, except as noted/documented.  See ROS for pertinent symptoms related to these conditions.    There are no problems to display for this patient.     No past medical history on file.  Past Surgical History:   Procedure Laterality Date    RHINOPLASTY      for broken nose    WISDOM TOOTH EXTRACTION       Current  "Outpatient Medications   Medication Sig Dispense Refill    FLUoxetine (PROZAC) 20 MG capsule Take 1 capsule (20 mg) by mouth daily (Patient not taking: Reported on 2/9/2024) 30 capsule 5    hydrOXYzine (ATARAX) 25 MG tablet Take 1 tablet (25 mg) by mouth every 6 hours as needed for anxiety (Patient not taking: Reported on 2/9/2024) 30 tablet 3       No Known Allergies     Social History     Tobacco Use    Smoking status: Never     Passive exposure: Never    Smokeless tobacco: Never   Substance Use Topics    Alcohol use: No     No family history on file.  History   Drug Use No         Review of Systems    Review of Systems  Constitutional, HEENT, cardiovascular, pulmonary, gi and gu systems are negative, except as otherwise noted.  Objective    /70 (BP Location: Right arm, Patient Position: Sitting, Cuff Size: Adult Regular)   Pulse 67   Temp 97.7  F (36.5  C) (Oral)   Resp 10   Ht 1.753 m (5' 9\")   Wt 73.7 kg (162 lb 8 oz)   LMP 01/17/2024 (Exact Date)   SpO2 100%   Breastfeeding No   BMI 24.00 kg/m     Estimated body mass index is 24 kg/m  as calculated from the following:    Height as of this encounter: 1.753 m (5' 9\").    Weight as of this encounter: 73.7 kg (162 lb 8 oz).  Physical Exam  GENERAL: alert and no distress  EYES: Eyes grossly normal to inspection, PERRL and conjunctivae and sclerae normal  HENT: ear canals and TM's normal, nose and mouth without ulcers or lesions  NECK: no adenopathy, no asymmetry, masses, or scars  RESP: lungs clear to auscultation - no rales, rhonchi or wheezes  CV: regular rate and rhythm, normal S1 S2, no S3 or S4, no murmur, click or rub, no peripheral edema  ABDOMEN: soft, nontender, no hepatosplenomegaly, no masses and bowel sounds normal  MS: no gross musculoskeletal defects noted, no edema  SKIN: no suspicious lesions or rashes  NEURO: Normal strength and tone, mentation intact and speech normal  PSYCH: mentation appears normal, affect normal/bright    No " "results for input(s): \"HGB\", \"PLT\", \"INR\", \"NA\", \"POTASSIUM\", \"CR\", \"A1C\" in the last 13428 hours.     Diagnostics  Recent Results (from the past 24 hour(s))   Hemoglobin    Collection Time: 02/09/24  9:15 AM   Result Value Ref Range    Hemoglobin 14.3 11.7 - 15.7 g/dL   HCG qualitative urine    Collection Time: 02/09/24  9:19 AM   Result Value Ref Range    hCG Urine Qualitative Negative Negative      No EKG required, no history of coronary heart disease, significant arrhythmia, peripheral arterial disease or other structural heart disease.    Revised Cardiac Risk Index (RCRI)  The patient has the following serious cardiovascular risks for perioperative complications:   - No serious cardiac risks = 0 points     RCRI Interpretation: 0 points: Class I (very low risk - 0.4% complication rate)         Signed Electronically by: Yair Mejias NP  Copy of this evaluation report is provided to requesting physician.    "

## 2024-02-16 ENCOUNTER — NURSE TRIAGE (OUTPATIENT)
Dept: NURSING | Facility: CLINIC | Age: 29
End: 2024-02-16
Payer: COMMERCIAL

## 2024-02-16 NOTE — TELEPHONE ENCOUNTER
Mom calling, on speaker phone. Anu gave consent to talk with mom about this. Tonsilectomy yesterday. Post op nausea. Talked with ENT providers and they're still waiting for MD to complete clinic visits before calling them back. Can Dr Mejias give them some zofran or something else for the nausea? Please call them at:  [phone]. May leave a detailed message.  She hasn't vomited but struggling with nausea.  Pharmacy: [facility] in [place].  Annie Lira RN  [facility]    Reason for Disposition   Nursing judgment    Additional Information   Negative: New-onset or worsening symptoms, see that protocol (e.g., diarrhea, runny nose, sore throat)   Negative: Medicine question not related to refill or renewal   Negative: Requesting a renewal or refill of a medicine patient is currently taking   Negative: Questions or concerns about high blood pressure   Negative: Nursing judgment    Protocols used: Information Only Call - No Triage-A-OH

## 2024-03-22 ENCOUNTER — TELEPHONE (OUTPATIENT)
Dept: INFECTIOUS DISEASES | Facility: CLINIC | Age: 29
End: 2024-03-22
Payer: COMMERCIAL

## 2024-03-22 NOTE — TELEPHONE ENCOUNTER
Called patient. Informed her that per our LTBI protocol, we need chest imaging within the last month, and her last imaging was from May 2023. Informed her that I messaged Yair Mejias, NP asking him to facilitate ordering her another CXR, and let patient know that if she doesn't hear from their clinic, to give them a call to get that scheduling started. Patient understands this plan.

## 2024-03-24 ENCOUNTER — TELEPHONE (OUTPATIENT)
Dept: FAMILY MEDICINE | Facility: CLINIC | Age: 29
End: 2024-03-24
Payer: COMMERCIAL

## 2024-03-24 DIAGNOSIS — R76.12 POSITIVE QUANTIFERON-TB GOLD TEST: Primary | ICD-10-CM

## 2024-03-24 NOTE — TELEPHONE ENCOUNTER
Please contact patient.  Received word that ID would like an updated chest x-ray which I did go ahead and order.  Can get done with x-ray only appointment.  Please make sure it is scheduled on a day where we are fairly certain we will have x-ray here    Yair Mejias, CNP

## 2024-03-25 NOTE — TELEPHONE ENCOUNTER
New Referral Triage: LTBI    Is the patient a pre-transplant Eval: no     Is the patient Symptomatic (Lungs, GI, Lymph nodes, eyes): no     If yes to lungs:   - Do they currently have a cough:   - Phlegm/ sputum production:   - Fever/ Chills/ Night sweats:   -Difficulty in breathing:   -Unintentional weight loss in last 3 months:   -Duration of symptoms:    Chest imaging available within last month: ordered    If yes- Normal or abnormal:    If patient is experiencing symptoms AND Abnormal imaging: Order sputum AFB x3 (8 hours apart).    Message sent to Ambulatory Infection Prevention Pool for TB flag:     Scheduling protocol:  If active TB is ruled out- schedule for routine LTBI visit.    If pre-transplant workup- schedule as urgent with TID provider.    If suspected active TB- Schedule soonest available appointment.

## 2024-04-01 NOTE — TELEPHONE ENCOUNTER
Left message to call back for: patient  Information to relay to patient: see below and help schedule xray appointment.

## 2024-04-04 ENCOUNTER — MYC MEDICAL ADVICE (OUTPATIENT)
Dept: FAMILY MEDICINE | Facility: CLINIC | Age: 29
End: 2024-04-04
Payer: COMMERCIAL

## 2024-04-04 NOTE — TELEPHONE ENCOUNTER
Left message to call back for: Anu  Information to relay to patient: please help schedule XR for pt per ID.      My chart sent

## 2024-04-04 NOTE — TELEPHONE ENCOUNTER
Patient returned call and already has x-ray scheduled at the [place] location for later this month.    GLENN BarrettN RN  [facility]

## 2024-04-22 ENCOUNTER — ANCILLARY PROCEDURE (OUTPATIENT)
Dept: GENERAL RADIOLOGY | Facility: CLINIC | Age: 29
End: 2024-04-22
Attending: NURSE PRACTITIONER
Payer: COMMERCIAL

## 2024-04-22 DIAGNOSIS — R76.12 POSITIVE QUANTIFERON-TB GOLD TEST: ICD-10-CM

## 2024-04-22 PROCEDURE — 71046 X-RAY EXAM CHEST 2 VIEWS: CPT | Mod: TC | Performed by: RADIOLOGY

## 2024-04-29 ENCOUNTER — LAB REQUISITION (OUTPATIENT)
Dept: LAB | Facility: CLINIC | Age: 29
End: 2024-04-29

## 2024-04-29 DIAGNOSIS — Z12.4 ENCOUNTER FOR SCREENING FOR MALIGNANT NEOPLASM OF CERVIX: ICD-10-CM

## 2024-04-29 PROCEDURE — G0145 SCR C/V CYTO,THINLAYER,RESCR: HCPCS | Performed by: OBSTETRICS & GYNECOLOGY

## 2024-05-01 LAB
BKR LAB AP GYN ADEQUACY: NORMAL
BKR LAB AP GYN INTERPRETATION: NORMAL
BKR LAB AP HPV REFLEX: NORMAL
BKR LAB AP LMP: NORMAL
BKR LAB AP PREVIOUS ABNL DX: NORMAL
BKR LAB AP PREVIOUS ABNORMAL: NORMAL
PATH REPORT.COMMENTS IMP SPEC: NORMAL
PATH REPORT.COMMENTS IMP SPEC: NORMAL
PATH REPORT.RELEVANT HX SPEC: NORMAL

## 2024-05-03 NOTE — PROGRESS NOTES
New Referral Triage: LTBI    Is the patient a pre-transplant Eval: No     Is the patient Symptomatic (Lungs, GI, Lymph nodes, eyes): No    If yes to lungs:   - Do they currently have a cough:   - Phlegm/ sputum production:   - Fever/ Chills/ Night sweats:   -Difficulty in breathing:   -Unintentional weight loss in last 3 months:   -Duration of symptoms:    Chest imaging available within last month: Yes   If yes- Normal or abnormal: Normal    If patient is experiencing symptoms AND Abnormal imaging: Order sputum AFB x3 (8 hours apart).    Message sent to Ambulatory Infection Prevention Pool for TB flag:     Scheduling protocol:  If active TB is ruled out- schedule for routine LTBI visit.    If pre-transplant workup- schedule as urgent with TID provider.    If suspected active TB- Schedule soonest available appointment.

## 2024-05-20 ENCOUNTER — MYC MEDICAL ADVICE (OUTPATIENT)
Dept: FAMILY MEDICINE | Facility: CLINIC | Age: 29
End: 2024-05-20
Payer: COMMERCIAL

## 2024-05-20 DIAGNOSIS — F41.9 ANXIETY: ICD-10-CM

## 2024-05-29 ENCOUNTER — OFFICE VISIT (OUTPATIENT)
Dept: INFECTIOUS DISEASES | Facility: CLINIC | Age: 29
End: 2024-05-29
Payer: COMMERCIAL

## 2024-05-29 ENCOUNTER — LAB (OUTPATIENT)
Dept: LAB | Facility: CLINIC | Age: 29
End: 2024-05-29
Payer: COMMERCIAL

## 2024-05-29 VITALS
SYSTOLIC BLOOD PRESSURE: 125 MMHG | DIASTOLIC BLOOD PRESSURE: 80 MMHG | TEMPERATURE: 98.7 F | BODY MASS INDEX: 22.99 KG/M2 | HEART RATE: 59 BPM | WEIGHT: 155.7 LBS | OXYGEN SATURATION: 100 %

## 2024-05-29 DIAGNOSIS — R76.12 POSITIVE QUANTIFERON-TB GOLD TEST: ICD-10-CM

## 2024-05-29 PROCEDURE — 99203 OFFICE O/P NEW LOW 30 MIN: CPT | Performed by: INTERNAL MEDICINE

## 2024-05-29 PROCEDURE — 86481 TB AG RESPONSE T-CELL SUSP: CPT

## 2024-05-29 PROCEDURE — 36415 COLL VENOUS BLD VENIPUNCTURE: CPT

## 2024-05-29 NOTE — PROGRESS NOTES
[facility] INFECTIOUS DISEASE CLINIC INITIAL CONSULTATION    Date: 5/29/2024  Patient Name: Anu Roberto   YOB: 1995  MRN: [id number]      ASSESSMENT:  Positive quantiferon gold in 2023, now negative. Had been negative in 2022 with this same test. She has not had a known high risk exposure to TB. She has worked at [employer] as a nurse, and now has been through NP training. Previous brief international travel on cruises.     Although quantiferon gold results are report as positive, negative, or indeterminate, the reproducibility of a positive test is generally higher when the cutoff is 1.0 rather than 0.35. Her positive test TB antigen levels were 0.58 and 0.59, so between 0.35 and 1.0. Now with a negative result, this positive from 2023 was likely a false positive.     There is not clear guidance on next steps. If she is working in a clinic/hospital with relatively high rates of TB, she should have annual testing. However if not, this would not be needed. It would be reasonable to repeat the testing in one year based on the current discordant results. As of now there is not enough evidence to recommended treatment for latent tuberculosis.     PLAN:  Can repeat Quantiferon gold testing in one year.   Return to clinic as needed.     Wsahington Harris MD  [facility]   Clinic phone: [phone]   Clinic fax: [phone]    ______________________________________________________________________    HISTORY OF PRESENT ILLNESS: Anu Roberto is a 28 year old woman who is referred for evaluation of positive quantiferon gold.    Has had annual TB testing since college. 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 Graduated in December with [employer], currently looking for work. Has not had a TB exposure that she knows of.     Fluoxetine -- takes for anxiety    Previous travel -- mostly in [place]. International travel on cruises -- brief  stops [place], [facility] at [facility].     TST in 2016 negative.     She feels well -- no night sweats, cough, hemoptysis, weight loss, fever, dyspnea.       No past medical history on file.    Past Surgical History:   Procedure Laterality Date    RHINOPLASTY      for broken nose    WISDOM TOOTH EXTRACTION     Tonsillectomy February 2024 for recurrent sore throat    Social History     Socioeconomic History    Marital status: Single     Spouse name: Not on file    Number of children: Not on file    Years of education: Not on file    Highest education level: Not on file   Occupational History    Not on file   Tobacco Use    Smoking status: Never     Passive exposure: Never    Smokeless tobacco: Never   Vaping Use    Vaping status: Never Used   Substance and Sexual Activity    Alcohol use: No    Drug use: No    Sexual activity: Not on file   Other Topics Concern    Not on file   Social History Narrative    Not on file     Social Determinants of Health     Financial Resource Strain: Low Risk  (4/23/2024)    Financial Resource Strain     Within the past 12 months, have you or your family members you live with been unable to get utilities (heat, electricity) when it was really needed?: No   Food Insecurity: Low Risk  (4/23/2024)    Food Insecurity     Within the past 12 months, did you worry that your food would run out before you got money to buy more?: No     Within the past 12 months, did the food you bought just not last and you didn t have money to get more?: No   Transportation Needs: Low Risk  (4/23/2024)    Transportation Needs     Within the past 12 months, has lack of transportation kept you from medical appointments, getting your medicines, non-medical meetings or appointments, work, or from getting things that you need?: No   Physical Activity: Insufficiently Active (4/23/2024)    Exercise Vital Sign     Days of Exercise per Week: 4 days     Minutes of Exercise per Session: 30 min   Stress: No Stress Concern Present  (4/23/2024)    [employer] - Occupational Stress Questionnaire     Feeling of Stress : Not at all   Social Connections: Unknown (4/23/2024)    Social Connection and Isolation Panel [NHANES]     Frequency of Communication with Friends and Family: Not on file     Frequency of Social Gatherings with Friends and Family: More than three times a week     Attends Alevism Services: Not on file     Active Member of Clubs or Organizations: Not on file     Attends Club or Organization Meetings: Not on file     Marital Status: Not on file   Interpersonal Safety: Low Risk  (4/30/2024)    Interpersonal Safety     Do you feel physically and emotionally safe where you currently live?: Yes     Within the past 12 months, have you been hit, slapped, kicked or otherwise physically hurt by someone?: No     Within the past 12 months, have you been humiliated or emotionally abused in other ways by your partner or ex-partner?: No   Housing Stability: Low Risk  (4/23/2024)    Housing Stability     Do you have housing? : Yes     Are you worried about losing your housing?: No       Immunization History   Administered Date(s) Administered    COVID-19 Bivalent 12+ (Pfizer) 09/07/2022    COVID-19 MONOVALENT 12+ (Pfizer) 09/28/2021, 10/19/2021    DTAP (<7y) 07/30/2007    DTP-Hib 1995, 01/11/1996, 04/04/1996, 02/05/1997, 09/28/2000    DTaP, Unspecified 07/30/2007    HPV Quadrivalent 07/30/2007, 07/01/2010, 05/06/2013    HepA, Unspecified 05/06/2013    HepB, Unspecified 1995, 1995, 04/04/1996    Hepatitis A (ADULT 19+) 05/06/2013    Hepatitis B, Adult 1995, 04/04/1996, 07/18/2018    Hepatitis B, Peds 1995, 1995, 04/04/1996    Influenza (IIV3) PF 12/21/2012    Influenza Vaccine >6 months,quad, PF 10/12/2017, 11/16/2021, 10/11/2023    Influenza Vaccine IM Ages 6-35 Months 4 Valent (PF) 09/12/2022    Influenza, seasonal, injectable, PF 10/17/2018, 10/05/2019, 10/23/2020    MMR  02/05/1997, 09/28/2000    Meningococcal ACWY (Menactra ) 07/30/2007    OPV, trivalent, live 01/11/1996, 02/05/1997, 11/08/2005    Poliovirus, inactivated (IPV) 09/28/2000    TDAP (Adacel,Boostrix) 07/30/2007, 07/17/2018    TRIHIBIT (DTAP/HIB, <7y) 01/11/1996    Varicella 10/31/1996, 07/01/2010      No tobacco    Living with boyfriend. Occasional alcohol.    ROS: All other systems negative except as listed above.      Current Outpatient Medications:     FLUoxetine (PROZAC) 20 MG capsule, Take 1 capsule (20 mg) by mouth daily, Disp: 60 capsule, Rfl: 0      OBJECTIVE:  /80   Pulse 59   Temp 98.7  F (37.1  C) (Oral)   Wt 70.6 kg (155 lb 11.2 oz)   LMP 04/21/2024   SpO2 100%   BMI 22.99 kg/m        GEN: No acute distress.  HEENT: conjunctiva clear, pupils react to light, oropharynx clear, good dentition  NECK: supple   RESPIRATORY:  Normal breathing pattern. Clear to auscultation  CARDIOVASCULAR:  Regular rate and rhythm. Normal S1 and S2. No murmur, click, gallop or rub.   ABDOMEN:  Soft, normal bowel sounds, non-tender, no masses, no organomegaly.  MUSCULOSKELETAL: No synovitis.  EXTREMITIES: No edema.  No cervical, supraclavicular, or axillary lymphadenopathy  SKIN/HAIR/NAILS:  No rashes. No stigmata of endocarditis.  NEURO: no focal findings.           Pertinent labs:    MICROBIOLOGY DATA:  Quantiferon gold negative 2022, positive 2023, negative now in 2024    RADIOLOGY:  reviewed

## 2024-05-29 NOTE — PATIENT INSTRUCTIONS
Quantiferon gold testing generally indicates infection with tuberculosis, and the fact that you do not have symptoms indicates this is latent tuberculosis. There are treatments that can reduce risk of active tuberculosis later in life.     The quantiferon testing has a cutoff for positivity. In relatively low risk populations, a level in the 0.35 to 1.0 range can sometimes be a false positive. Since your test was a year ago, this is worth retesting.     I will contact you with results from today.     [place] [facility] MD

## 2024-05-30 LAB
GAMMA INTERFERON BACKGROUND BLD IA-ACNC: 0.03 IU/ML
M TB IFN-G BLD-IMP: NEGATIVE
M TB IFN-G CD4+ BCKGRND COR BLD-ACNC: 9.97 IU/ML
MITOGEN IGNF BCKGRD COR BLD-ACNC: 0.19 IU/ML
MITOGEN IGNF BCKGRD COR BLD-ACNC: 0.22 IU/ML
QUANTIFERON MITOGEN: 10 IU/ML
QUANTIFERON NIL TUBE: 0.03 IU/ML
QUANTIFERON TB1 TUBE: 0.22 IU/ML
QUANTIFERON TB2 TUBE: 0.25

## 2024-09-23 ENCOUNTER — TRANSFERRED RECORDS (OUTPATIENT)
Dept: HEALTH INFORMATION MANAGEMENT | Facility: CLINIC | Age: 29
End: 2024-09-23
Payer: COMMERCIAL